# Patient Record
Sex: MALE | Race: WHITE | URBAN - METROPOLITAN AREA
[De-identification: names, ages, dates, MRNs, and addresses within clinical notes are randomized per-mention and may not be internally consistent; named-entity substitution may affect disease eponyms.]

---

## 2020-05-12 ENCOUNTER — NEW PATIENT (OUTPATIENT)
Dept: URBAN - METROPOLITAN AREA CLINIC 33 | Facility: CLINIC | Age: 65
End: 2020-05-12

## 2020-05-12 VITALS
HEART RATE: 75 BPM | HEIGHT: 70 IN | SYSTOLIC BLOOD PRESSURE: 120 MMHG | BODY MASS INDEX: 25.77 KG/M2 | DIASTOLIC BLOOD PRESSURE: 78 MMHG | WEIGHT: 180 LBS

## 2020-05-12 DIAGNOSIS — H33.321: ICD-10-CM

## 2020-05-12 DIAGNOSIS — H43.813: ICD-10-CM

## 2020-05-12 DIAGNOSIS — H33.102: ICD-10-CM

## 2020-05-12 DIAGNOSIS — H35.371: ICD-10-CM

## 2020-05-12 PROCEDURE — 99204 OFFICE O/P NEW MOD 45 MIN: CPT

## 2020-05-12 PROCEDURE — 92250 FUNDUS PHOTOGRAPHY W/I&R: CPT

## 2020-05-12 ASSESSMENT — TONOMETRY
OD_IOP_MMHG: 12
OS_IOP_MMHG: 11

## 2020-05-12 ASSESSMENT — VISUAL ACUITY
OS_CC: 20/15-1
OD_CC: 20/30+1
OS_SC: 20/200-1
OD_SC: 20/400

## 2020-06-15 NOTE — PATIENT DISCUSSION
Pinguecula Counseling:  I have explained to the patient at length the diagnosis of pinguecula and its pathophysiology. I recommended the patient adequately protect their eyes from excessive UV light and dry, brigitte conditions. The use of artificial tears in dry conditions was encouraged. Return for follow-up as scheduled.

## 2022-06-04 ENCOUNTER — TELEPHONE ENCOUNTER (OUTPATIENT)
Dept: URBAN - METROPOLITAN AREA CLINIC 68 | Facility: CLINIC | Age: 67
End: 2022-06-04

## 2022-06-05 ENCOUNTER — TELEPHONE ENCOUNTER (OUTPATIENT)
Dept: URBAN - METROPOLITAN AREA CLINIC 68 | Facility: CLINIC | Age: 67
End: 2022-06-05

## 2022-06-20 ENCOUNTER — FOLLOW UP (OUTPATIENT)
Dept: URBAN - METROPOLITAN AREA CLINIC 33 | Facility: CLINIC | Age: 67
End: 2022-06-20

## 2022-06-20 DIAGNOSIS — H25.13: ICD-10-CM

## 2022-06-20 DIAGNOSIS — H33.102: ICD-10-CM

## 2022-06-20 DIAGNOSIS — H43.813: ICD-10-CM

## 2022-06-20 DIAGNOSIS — H35.371: ICD-10-CM

## 2022-06-20 DIAGNOSIS — H33.321: ICD-10-CM

## 2022-06-20 PROCEDURE — 92250 FUNDUS PHOTOGRAPHY W/I&R: CPT

## 2022-06-20 PROCEDURE — 92014 COMPRE OPH EXAM EST PT 1/>: CPT

## 2022-06-20 ASSESSMENT — VISUAL ACUITY
OS_CC: 20/20-2
OD_CC: 20/20-1

## 2022-06-20 ASSESSMENT — TONOMETRY
OD_IOP_MMHG: 12
OS_IOP_MMHG: 10

## 2022-06-25 ENCOUNTER — TELEPHONE ENCOUNTER (OUTPATIENT)
Age: 67
End: 2022-06-25

## 2022-06-26 ENCOUNTER — TELEPHONE ENCOUNTER (OUTPATIENT)
Age: 67
End: 2022-06-26

## 2022-08-03 NOTE — PATIENT DISCUSSION
NVS at this time. Instructed to call if any new distortion, blurring, decreased vision or eye pain. RD precautions discussed.

## 2024-07-18 ENCOUNTER — FOLLOW UP (OUTPATIENT)
Dept: URBAN - METROPOLITAN AREA CLINIC 33 | Facility: CLINIC | Age: 69
End: 2024-07-18

## 2024-07-18 VITALS — HEIGHT: 70 IN | BODY MASS INDEX: 28.63 KG/M2 | WEIGHT: 200 LBS

## 2024-07-18 DIAGNOSIS — H35.371: ICD-10-CM

## 2024-07-18 DIAGNOSIS — H33.321: ICD-10-CM

## 2024-07-18 DIAGNOSIS — H33.102: ICD-10-CM

## 2024-07-18 DIAGNOSIS — H43.813: ICD-10-CM

## 2024-07-18 DIAGNOSIS — H25.13: ICD-10-CM

## 2024-07-18 PROCEDURE — 92014 COMPRE OPH EXAM EST PT 1/>: CPT

## 2024-07-18 PROCEDURE — 92250 FUNDUS PHOTOGRAPHY W/I&R: CPT | Mod: 59

## 2024-07-18 PROCEDURE — 92134 CPTRZ OPH DX IMG PST SGM RTA: CPT

## 2024-07-18 ASSESSMENT — TONOMETRY
OD_IOP_MMHG: 10
OS_IOP_MMHG: 9

## 2024-07-18 ASSESSMENT — VISUAL ACUITY
OD_SC: 20/20
OS_SC: 20/20